# Patient Record
Sex: MALE | Race: WHITE | NOT HISPANIC OR LATINO | URBAN - METROPOLITAN AREA
[De-identification: names, ages, dates, MRNs, and addresses within clinical notes are randomized per-mention and may not be internally consistent; named-entity substitution may affect disease eponyms.]

---

## 2020-09-24 ENCOUNTER — APPOINTMENT (OUTPATIENT)
Dept: URBAN - METROPOLITAN AREA CLINIC 217 | Age: 70
Setting detail: DERMATOLOGY
End: 2020-09-24

## 2020-09-24 VITALS — TEMPERATURE: 97.7 F

## 2020-09-24 DIAGNOSIS — L81.4 OTHER MELANIN HYPERPIGMENTATION: ICD-10-CM

## 2020-09-24 DIAGNOSIS — D485 NEOPLASM OF UNCERTAIN BEHAVIOR OF SKIN: ICD-10-CM

## 2020-09-24 PROBLEM — D48.5 NEOPLASM OF UNCERTAIN BEHAVIOR OF SKIN: Status: ACTIVE | Noted: 2020-09-24

## 2020-09-24 PROCEDURE — OTHER REASSURANCE: OTHER

## 2020-09-24 PROCEDURE — OTHER SHAVE REMOVAL: OTHER

## 2020-09-24 PROCEDURE — 99203 OFFICE O/P NEW LOW 30 MIN: CPT | Mod: 25

## 2020-09-24 PROCEDURE — OTHER DIAGNOSIS COMMENT: OTHER

## 2020-09-24 PROCEDURE — 11301 SHAVE SKIN LESION 0.6-1.0 CM: CPT

## 2020-09-24 PROCEDURE — OTHER COUNSELING: OTHER

## 2020-09-24 ASSESSMENT — LOCATION DETAILED DESCRIPTION DERM
LOCATION DETAILED: RIGHT RIB CAGE
LOCATION DETAILED: LEFT SUPERIOR UPPER BACK

## 2020-09-24 ASSESSMENT — LOCATION SIMPLE DESCRIPTION DERM
LOCATION SIMPLE: ABDOMEN
LOCATION SIMPLE: LEFT UPPER BACK

## 2020-09-24 ASSESSMENT — LOCATION ZONE DERM: LOCATION ZONE: TRUNK

## 2020-09-24 NOTE — PROCEDURE: SHAVE REMOVAL
Post-Care Instructions: I reviewed with the patient in detail post-care instructions. Patient is to keep the office bandage on and dry overnight, and then apply Polysporin, Bacitracin or Vasoline daily until healed.  Patient may irrigate with water to remove any crusting.  Wound Care written instructions given.
Wound Care: Vaseline
Billing Type: Third-Party Bill
Add Variable For Additional Medical Justification: No
Path Notes (To The Dermatopathologist): CC: Dr. Ewa Cooper
X Size Of Lesion In Cm (Optional): 0
Anesthesia Volume In Cc: 1
Anticipated Plan (Based On Presumed Biopsy Results): Further management pending pathology
Render Post-Care Instructions In Note?: yes
Hemostasis: Aluminum Chloride and Electrocautery
Biopsy Method: Dermablade
Consent was obtained from the patient. The risks and benefits to therapy were discussed in detail. Specifically, the risks of infection, scarring, bleeding, prolonged wound healing, incomplete removal, allergy to anesthesia, nerve injury and recurrence were addressed. Prior to the procedure, the treatment site was clearly identified and confirmed by the patient.
Anesthesia Type: 1% Xylocaine without epinephrine
Detail Level: Detailed
Size Of Lesion In Cm (Required): 0.6
Medical Necessity Information: It is in your best interest to select a reason for this procedure from the list below. All of these items fulfill various CMS LCD requirements except the new and changing color options.

## 2021-06-25 RX ORDER — CELECOXIB 200 MG/1
200 CAPSULE ORAL PRN
COMMUNITY

## 2021-06-25 RX ORDER — NEBIVOLOL 5 MG/1
5 TABLET ORAL DAILY
COMMUNITY

## 2021-06-25 RX ORDER — CYCLOBENZAPRINE HCL 5 MG
5 TABLET ORAL 3 TIMES DAILY PRN
COMMUNITY

## 2021-06-25 NOTE — PROGRESS NOTES
procedure to notify you if your arrival time changes    [x] If you receive a survey after surgery we would greatly appreciate your comments    [] Parent/guardian of a minor must accompany their child and remain on the premises  the entire time they are under our care     [] Pediatric patients may bring favorite toy, blanket or comfort item with them    [] A caregiver or family member must remain with the patient during their stay if they are mentally handicapped, have dementia, disoriented or unable to use a call light or would be a safety concern if left unattended    [x] Please notify surgeon if you develop any illness between now and time of surgery (cold, cough, sore throat, fever, nausea, vomiting) or any signs of infections  including skin, wounds, and dental.    [x]  The Outpatient Pharm[facility] is available to fill your prescription here on your day of surgery, ask your preop nurse for details    [x] Other instructions: Wear comfortable clothing    EDUCATIONAL MATERIALS PROVIDED:    [] PAT Preoperative Education Packet/Booklet     [] Medication List    [] Transfusion bracelet applied with instructions    [] Shower with soap, lather and rinse well, and use CHG wipes provided the evening before surgery as instructed    [] Incentive spirometer with instructions          Have you been tested for COVID  No           Have you been told you were positive for COVID No  Have you had any known exposure to someone that is positive for COVID No  Do you have a cough                   No              Do you have shortness of breath No                 Do you have a sore throat            No                Are you having chills                    No                Are you having muscle aches. No                    Please come to the hospital wearing a mask and have your significant other wear a mask as well.   Both of you should check your temperature before leaving to come here,  if it is 100 or higher please call [phone] for instruction.

## 2021-06-28 ENCOUNTER — ANESTHESIA (OUTPATIENT)
Dept: OPERATING ROOM | Age: 71
End: 2021-06-28
Payer: MEDICARE

## 2021-06-28 ENCOUNTER — ANESTHESIA EVENT (OUTPATIENT)
Dept: OPERATING ROOM | Age: 71
End: 2021-06-28
Payer: MEDICARE

## 2021-06-28 ENCOUNTER — HOSPITAL ENCOUNTER (OUTPATIENT)
Age: 71
Setting detail: OUTPATIENT SURGERY
Discharge: HOME | End: 2021-06-28
Attending: OPHTHALMOLOGY | Admitting: OPHTHALMOLOGY
Payer: MEDICARE

## 2021-06-28 VITALS — DIASTOLIC BLOOD PRESSURE: 102 MMHG | OXYGEN SATURATION: 99 % | SYSTOLIC BLOOD PRESSURE: 168 MMHG

## 2021-06-28 VITALS
TEMPERATURE: 97.4 F | BODY MASS INDEX: 31.44 KG/M2 | OXYGEN SATURATION: 97 % | WEIGHT: 245 LBS | RESPIRATION RATE: 14 BRPM | DIASTOLIC BLOOD PRESSURE: 76 MMHG | SYSTOLIC BLOOD PRESSURE: 139 MMHG | HEIGHT: 74 IN | HEART RATE: 56 BPM

## 2021-06-28 PROBLEM — H33.002 RETINAL DETACHMENT OF LEFT EYE DUE TO TEAR OF RETINA: Chronic | Status: ACTIVE | Noted: 2021-06-28

## 2021-06-28 PROCEDURE — 6370000000 HC RX 637 (ALT 250 FOR IP): Performed by: OPHTHALMOLOGY

## 2021-06-28 PROCEDURE — 3700000001 HC ADD 15 MINUTES (ANESTHESIA): Performed by: OPHTHALMOLOGY

## 2021-06-28 PROCEDURE — 3600000003 HC SURGERY LEVEL 3 BASE: Performed by: OPHTHALMOLOGY

## 2021-06-28 PROCEDURE — 6360000002 HC RX W HCPCS: Performed by: NURSE ANESTHETIST, CERTIFIED REGISTERED

## 2021-06-28 PROCEDURE — 6360000002 HC RX W HCPCS: Performed by: OPHTHALMOLOGY

## 2021-06-28 PROCEDURE — 2580000003 HC RX 258: Performed by: OPHTHALMOLOGY

## 2021-06-28 PROCEDURE — 2500000003 HC RX 250 WO HCPCS: Performed by: NURSE ANESTHETIST, CERTIFIED REGISTERED

## 2021-06-28 PROCEDURE — 3700000000 HC ANESTHESIA ATTENDED CARE: Performed by: OPHTHALMOLOGY

## 2021-06-28 PROCEDURE — 2709999900 HC NON-CHARGEABLE SUPPLY: Performed by: OPHTHALMOLOGY

## 2021-06-28 PROCEDURE — 6370000000 HC RX 637 (ALT 250 FOR IP)

## 2021-06-28 PROCEDURE — 7100000011 HC PHASE II RECOVERY - ADDTL 15 MIN: Performed by: OPHTHALMOLOGY

## 2021-06-28 PROCEDURE — 2500000003 HC RX 250 WO HCPCS: Performed by: OPHTHALMOLOGY

## 2021-06-28 PROCEDURE — 7100000010 HC PHASE II RECOVERY - FIRST 15 MIN: Performed by: OPHTHALMOLOGY

## 2021-06-28 PROCEDURE — 3600000013 HC SURGERY LEVEL 3 ADDTL 15MIN: Performed by: OPHTHALMOLOGY

## 2021-06-28 RX ORDER — TRIAMCINOLONE ACETONIDE 40 MG/ML
INJECTION, SUSPENSION INTRA-ARTICULAR; INTRAMUSCULAR PRN
Status: DISCONTINUED | OUTPATIENT
Start: 2021-06-28 | End: 2021-06-28 | Stop reason: ALTCHOICE

## 2021-06-28 RX ORDER — ACETAMINOPHEN 325 MG/1
650 TABLET ORAL
Status: COMPLETED | OUTPATIENT
Start: 2021-06-28 | End: 2021-06-28

## 2021-06-28 RX ORDER — CEFAZOLIN SODIUM 1 G/3ML
INJECTION, POWDER, FOR SOLUTION INTRAMUSCULAR; INTRAVENOUS PRN
Status: DISCONTINUED | OUTPATIENT
Start: 2021-06-28 | End: 2021-06-28 | Stop reason: ALTCHOICE

## 2021-06-28 RX ORDER — SODIUM CHLORIDE 9 MG/ML
25 INJECTION, SOLUTION INTRAVENOUS PRN
Status: DISCONTINUED | OUTPATIENT
Start: 2021-06-28 | End: 2021-06-28 | Stop reason: HOSPADM

## 2021-06-28 RX ORDER — DEXAMETHASONE SODIUM PHOSPHATE 10 MG/ML
INJECTION, SOLUTION INTRAMUSCULAR; INTRAVENOUS PRN
Status: DISCONTINUED | OUTPATIENT
Start: 2021-06-28 | End: 2021-06-28 | Stop reason: ALTCHOICE

## 2021-06-28 RX ORDER — LIDOCAINE HYDROCHLORIDE 20 MG/ML
INJECTION, SOLUTION EPIDURAL; INFILTRATION; INTRACAUDAL; PERINEURAL PRN
Status: DISCONTINUED | OUTPATIENT
Start: 2021-06-28 | End: 2021-06-28

## 2021-06-28 RX ORDER — SODIUM CHLORIDE 0.9 % (FLUSH) 0.9 %
5-40 SYRINGE (ML) INJECTION EVERY 12 HOURS SCHEDULED
Status: DISCONTINUED | OUTPATIENT
Start: 2021-06-28 | End: 2021-06-28 | Stop reason: HOSPADM

## 2021-06-28 RX ORDER — HYDROCODONE BITARTRATE AND ACETAMINOPHEN 5; 325 MG/1; MG/1
1 TABLET ORAL PRN
Status: DISCONTINUED | OUTPATIENT
Start: 2021-06-28 | End: 2021-06-28 | Stop reason: HOSPADM

## 2021-06-28 RX ORDER — PROPOFOL 10 MG/ML
INJECTION, EMULSION INTRAVENOUS PRN
Status: DISCONTINUED | OUTPATIENT
Start: 2021-06-28 | End: 2021-06-28 | Stop reason: SDUPTHER

## 2021-06-28 RX ORDER — FLURBIPROFEN SODIUM 0.3 MG/ML
1 SOLUTION/ DROPS OPHTHALMIC PRN
Status: COMPLETED | OUTPATIENT
Start: 2021-06-28 | End: 2021-06-28

## 2021-06-28 RX ORDER — ATROPINE SULFATE 10 MG/ML
SOLUTION/ DROPS OPHTHALMIC PRN
Status: DISCONTINUED | OUTPATIENT
Start: 2021-06-28 | End: 2021-06-28 | Stop reason: ALTCHOICE

## 2021-06-28 RX ORDER — LIDOCAINE HYDROCHLORIDE 20 MG/ML
INJECTION, SOLUTION EPIDURAL; INFILTRATION; INTRACAUDAL; PERINEURAL PRN
Status: DISCONTINUED | OUTPATIENT
Start: 2021-06-28 | End: 2021-06-28 | Stop reason: SDUPTHER

## 2021-06-28 RX ORDER — BALANCED SALT SOLUTION ENRICHED WITH BICARBONATE, DEXTROSE, AND GLUTATHIONE
KIT INTRAOCULAR PRN
Status: DISCONTINUED | OUTPATIENT
Start: 2021-06-28 | End: 2021-06-28 | Stop reason: ALTCHOICE

## 2021-06-28 RX ORDER — ACETAMINOPHEN 325 MG/1
TABLET ORAL
Status: COMPLETED
Start: 2021-06-28 | End: 2021-06-28

## 2021-06-28 RX ORDER — TOBRAMYCIN AND DEXAMETHASONE 3; 1 MG/ML; MG/ML
1 SUSPENSION/ DROPS OPHTHALMIC PRN
Status: COMPLETED | OUTPATIENT
Start: 2021-06-28 | End: 2021-06-28

## 2021-06-28 RX ORDER — PHENYLEPHRINE HYDROCHLORIDE 100 MG/ML
1 SOLUTION/ DROPS OPHTHALMIC PRN
Status: COMPLETED | OUTPATIENT
Start: 2021-06-28 | End: 2021-06-28

## 2021-06-28 RX ORDER — ATROPINE SULFATE 10 MG/ML
1 SOLUTION/ DROPS OPHTHALMIC PRN
Status: DISCONTINUED | OUTPATIENT
Start: 2021-06-28 | End: 2021-06-28 | Stop reason: HOSPADM

## 2021-06-28 RX ORDER — SODIUM CHLORIDE, SODIUM LACTATE, POTASSIUM CHLORIDE, CALCIUM CHLORIDE 600; 310; 30; 20 MG/100ML; MG/100ML; MG/100ML; MG/100ML
INJECTION, SOLUTION INTRAVENOUS CONTINUOUS
Status: DISCONTINUED | OUTPATIENT
Start: 2021-06-28 | End: 2021-06-28 | Stop reason: HOSPADM

## 2021-06-28 RX ORDER — SODIUM CHLORIDE 0.9 % (FLUSH) 0.9 %
5-40 SYRINGE (ML) INJECTION PRN
Status: DISCONTINUED | OUTPATIENT
Start: 2021-06-28 | End: 2021-06-28 | Stop reason: HOSPADM

## 2021-06-28 RX ORDER — HYDROCODONE BITARTRATE AND ACETAMINOPHEN 5; 325 MG/1; MG/1
2 TABLET ORAL PRN
Status: DISCONTINUED | OUTPATIENT
Start: 2021-06-28 | End: 2021-06-28 | Stop reason: HOSPADM

## 2021-06-28 RX ADMIN — SODIUM CHLORIDE, POTASSIUM CHLORIDE, SODIUM LACTATE AND CALCIUM CHLORIDE: 600; 310; 30; 20 INJECTION, SOLUTION INTRAVENOUS at 10:50

## 2021-06-28 RX ADMIN — ACETAMINOPHEN 650 MG: 325 TABLET ORAL at 14:50

## 2021-06-28 RX ADMIN — Medication 1 DROP: at 10:32

## 2021-06-28 RX ADMIN — PROPOFOL 120 MG: 10 INJECTION, EMULSION INTRAVENOUS at 13:15

## 2021-06-28 RX ADMIN — LIDOCAINE HYDROCHLORIDE 40 MG: 20 INJECTION, SOLUTION EPIDURAL; INFILTRATION; INTRACAUDAL; PERINEURAL at 13:15

## 2021-06-28 RX ADMIN — Medication 1 DROP: at 10:42

## 2021-06-28 RX ADMIN — PHENYLEPHRINE HYDROCHLORIDE 1 DROP: 100 SOLUTION/ DROPS OPHTHALMIC at 10:37

## 2021-06-28 RX ADMIN — TOBRAMYCIN AND DEXAMETHASONE 1 DROP: 3; 1 SUSPENSION/ DROPS OPHTHALMIC at 10:32

## 2021-06-28 RX ADMIN — TOBRAMYCIN AND DEXAMETHASONE 1 DROP: 3; 1 SUSPENSION/ DROPS OPHTHALMIC at 10:37

## 2021-06-28 RX ADMIN — ATROPINE SULFATE 1 DROP: 10 SOLUTION/ DROPS OPHTHALMIC at 10:26

## 2021-06-28 RX ADMIN — PHENYLEPHRINE HYDROCHLORIDE 1 DROP: 100 SOLUTION/ DROPS OPHTHALMIC at 10:43

## 2021-06-28 RX ADMIN — Medication 1 DROP: at 10:37

## 2021-06-28 RX ADMIN — PHENYLEPHRINE HYDROCHLORIDE 1 DROP: 100 SOLUTION/ DROPS OPHTHALMIC at 10:32

## 2021-06-28 RX ADMIN — TOBRAMYCIN AND DEXAMETHASONE 1 DROP: 3; 1 SUSPENSION/ DROPS OPHTHALMIC at 10:43

## 2021-06-28 ASSESSMENT — PAIN DESCRIPTION - ORIENTATION
ORIENTATION: LEFT
ORIENTATION: LEFT

## 2021-06-28 ASSESSMENT — PAIN DESCRIPTION - LOCATION
LOCATION: EYE
LOCATION: EYE

## 2021-06-28 ASSESSMENT — PULMONARY FUNCTION TESTS
PIF_VALUE: 1
PIF_VALUE: 0
PIF_VALUE: 1
PIF_VALUE: 0
PIF_VALUE: 1
PIF_VALUE: 0
PIF_VALUE: 1

## 2021-06-28 ASSESSMENT — PAIN DESCRIPTION - DESCRIPTORS: DESCRIPTORS: DISCOMFORT

## 2021-06-28 ASSESSMENT — PAIN - FUNCTIONAL ASSESSMENT: PAIN_FUNCTIONAL_ASSESSMENT: 0-10

## 2021-06-28 ASSESSMENT — PAIN SCALES - GENERAL
PAINLEVEL_OUTOF10: 4
PAINLEVEL_OUTOF10: 4
PAINLEVEL_OUTOF10: 6
PAINLEVEL_OUTOF10: 6

## 2021-06-28 ASSESSMENT — PAIN DESCRIPTION - PAIN TYPE
TYPE: SURGICAL PAIN
TYPE: SURGICAL PAIN

## 2021-06-28 NOTE — ANESTHESIA PRE PROCEDURE
Department of Anesthesiology  Preprocedure Note       Name:  Bea Amador   Age:  72 y.o.  :  1950                                          MRN:  [id number]         Date:  2021      Surgeon: Viet York):  Fernando Waters MD    Procedure: Procedure(s):  PARS PLANA VITRECTOMY 25 GAUGE GAS FLUID EXCHANGE LASER POSSIBLE SCLERAL BUCKLE LEFT EYE    Medications prior to admission:   Prior to Admission medications    Medication Sig Start Date End Date Taking?  Authorizing Provider   nebivolol (BYSTOLIC) 5 MG tablet Take 5 mg by mouth daily Instructed to take morning of surgery with a sip of water   Yes Historical Provider, MD   celecoxib (CELEBREX) 200 MG capsule Take 200 mg by mouth as needed for Pain   Yes Historical Provider, MD   cyclobenzaprine (FLEXERIL) 5 MG tablet Take 5 mg by mouth 3 times daily as needed for Muscle spasms   Yes Historical Provider, MD       Current medications:    Current Facility-Administered Medications   Medication Dose Route Frequency Provider Last Rate Last Admin    lactated ringers infusion   Intravenous Continuous Fernando Waters MD        sodium chloride flush 0.9 % injection 5-40 mL  5-40 mL Intravenous 2 times per day Fernando Waters MD        sodium chloride flush 0.9 % injection 5-40 mL  5-40 mL Intravenous PRN Fernando Waters MD        0.9 % sodium chloride infusion  25 mL Intravenous PRN Fernando Waters MD        atropine 1 % ophthalmic solution 1 drop  1 drop Left Eye PRN Fernando Waters MD        tobramycin-dexamethasone [facility]) ophthalmic suspension 1 drop  1 drop Left Eye PRN Fernando Waters MD        flurbiprofen (OCUFEN) 0.03 % ophthalmic solution 1 drop  1 drop Left Eye PRN Fernando Waters MD        phenylephrine (CELINA-SYNEPHRINE) 10 % ophthalmic solution 1 drop  1 drop Left Eye PRN Fernando Waters MD        HYDROcodone-acetaminophen (NORCO) 5-325 MG per tablet 1 tablet  1 tablet Oral PRN Shay Valencia MD        Or  HYDROcodone-acetaminophen (NORCO) 5-325 MG per tablet 2 tablet  2 tablet Oral PRN Eloise Salinas MD           Allergies: Allergies   Allergen Reactions    Bee Venom        Problem List:  There is no problem list on file for this patient. Past Medical History:        Diagnosis Date    Arthritis     H/O detached retina repair     Hypertension        Past Surgical History:        Procedure Laterality Date    COLONOSCOPY      EYE SURGERY Left 04/14/2021    detached retina       Social History:    Social History     Tobacco Use    Smoking status: Former Smoker    Smokeless tobacco: Never Used   Substance Use Topics    Alcohol use: Yes     Comment: social                                Counseling given: Not Answered      Vital Signs (Current):   Vitals:    06/25/21 1247 06/28/21 1025   BP:  (!) 159/89   Pulse:  60   Resp:  18   Temp:  97.2 °F (36.2 °C)   TempSrc:  Temporal   SpO2:  95%   Weight: 245 lb (111.1 kg) 245 lb (111.1 kg)   Height: 6' 2\" (1.88 m) 6' 2\" (1.88 m)                                              BP Readings from Last 3 Encounters:   06/28/21 (!) 159/89       NPO Status: Time of last liquid consumption: 2000                                                                               BMI:   Wt Readings from Last 3 Encounters:   06/28/21 245 lb (111.1 kg)     Body mass index is 31.46 kg/m². CBC: No results found for: WBC, RBC, HGB, HCT, MCV, RDW, PLT    CMP: No results found for: NA, K, CL, CO2, BUN, CREATININE, GFRAA, AGRATIO, LABGLOM, GLUCOSE, PROT, CALCIUM, BILITOT, ALKPHOS, AST, ALT    POC Tests: No results for input(s): POCGLU, POCNA, POCK, POCCL, POCBUN, POCHEMO, POCHCT in the last 72 hours.     Coags: No results found for: PROTIME, INR, APTT    HCG (If Applicable): No results found for: PREGTESTUR, PREGSERUM, HCG, HCGQUANT     ABGs: No results found for: PHART, PO2ART, XSL7JAZ, GGS7OLN, BEART, J5MLGRFC     Type & Screen (If Applicable):  No results found for: LABABO, [address]    Drug/Infectious Status (If Applicable):  No results found for: HIV, HEPCAB    COVID-19 Screening (If Applicable): No results found for: COVID19        Anesthesia Evaluation  Patient summary reviewed no history of anesthetic complications:   Airway: Mallampati: II  TM distance: >3 FB   Neck ROM: full   Dental:          Pulmonary: breath sounds clear to auscultation                            ROS comment: Former Smoker   Cardiovascular:    (+) hypertension:,         Rhythm: regular  Rate: normal           Beta Blocker:  Dose within 24 Hrs         Neuro/Psych:   Negative Neuro/Psych ROS              GI/Hepatic/Renal: Neg GI/Hepatic/Renal ROS            Endo/Other:    (+) : arthritis: OA., .                  ROS comment: obese Abdominal:             Vascular: negative vascular ROS. Other Findings:           Anesthesia Plan      MAC     ASA 2       Induction: intravenous. Anesthetic plan and risks discussed with patient. Plan discussed with CRNA.                 Shama Espinosa MD   6/28/2021

## 2021-06-28 NOTE — BRIEF OP NOTE
Brief Postoperative Note      Patient: Duong Ewing  YOB: 1950  MRN: [id number]    Date of Procedure: 6/28/2021    Pre-Op Diagnosis: RETINAL DETACHMENT LEFT EYE    Post-Op Diagnosis: Same       Procedure(s):  PARS PLANA VITRECTOMY 25 GAUGE GAS FLUID EXCHANGE (C3F8)  LASER LEFT EYE    Surgeon(s):  Mac Colbert MD    Assistant:  Surgical Assistant: Cm Saenz    Anesthesia: Monitor Anesthesia Care    Estimated Blood Loss (mL): Minimal    Complications: None    Specimens:   none  Implants:  none    Drains: none  Findings:retinal detachment  Electronically signed by Mac Colbert MD on 6/28/2021 at 2:21 PM

## 2023-01-16 ENCOUNTER — APPOINTMENT (RX ONLY)
Dept: URBAN - METROPOLITAN AREA CLINIC 137 | Facility: CLINIC | Age: 73
Setting detail: DERMATOLOGY
End: 2023-01-16

## 2023-01-16 DIAGNOSIS — Z71.89 OTHER SPECIFIED COUNSELING: ICD-10-CM

## 2023-01-16 DIAGNOSIS — D18.0 HEMANGIOMA: ICD-10-CM | Status: UNCHANGED

## 2023-01-16 DIAGNOSIS — L82.1 OTHER SEBORRHEIC KERATOSIS: ICD-10-CM

## 2023-01-16 DIAGNOSIS — T07XXXA INSECT BITE, NONVENOMOUS, OF OTHER, MULTIPLE, AND UNSPECIFIED SITES, WITHOUT MENTION OF INFECTION: ICD-10-CM

## 2023-01-16 DIAGNOSIS — Z85.828 PERSONAL HISTORY OF OTHER MALIGNANT NEOPLASM OF SKIN: ICD-10-CM | Status: STABLE

## 2023-01-16 DIAGNOSIS — L57.8 OTHER SKIN CHANGES DUE TO CHRONIC EXPOSURE TO NONIONIZING RADIATION: ICD-10-CM | Status: UNCHANGED

## 2023-01-16 PROBLEM — S40.262A INSECT BITE (NONVENOMOUS) OF LEFT SHOULDER, INITIAL ENCOUNTER: Status: ACTIVE | Noted: 2023-01-16

## 2023-01-16 PROBLEM — D18.01 HEMANGIOMA OF SKIN AND SUBCUTANEOUS TISSUE: Status: ACTIVE | Noted: 2023-01-16

## 2023-01-16 PROCEDURE — 99213 OFFICE O/P EST LOW 20 MIN: CPT

## 2023-01-16 PROCEDURE — ? COUNSELING

## 2023-01-16 PROCEDURE — ? SUNSCREEN RECOMMENDATIONS

## 2023-01-16 ASSESSMENT — LOCATION DETAILED DESCRIPTION DERM
LOCATION DETAILED: LEFT CLAVICULAR NECK
LOCATION DETAILED: LEFT MEDIAL SUPERIOR CHEST
LOCATION DETAILED: LEFT POSTERIOR SHOULDER
LOCATION DETAILED: PERIUMBILICAL SKIN
LOCATION DETAILED: INFERIOR THORACIC SPINE
LOCATION DETAILED: RIGHT INFERIOR LATERAL MIDBACK
LOCATION DETAILED: RIGHT INFERIOR CENTRAL MALAR CHEEK

## 2023-01-16 ASSESSMENT — LOCATION ZONE DERM
LOCATION ZONE: FACE
LOCATION ZONE: NECK
LOCATION ZONE: ARM
LOCATION ZONE: TRUNK

## 2023-01-16 ASSESSMENT — LOCATION SIMPLE DESCRIPTION DERM
LOCATION SIMPLE: ABDOMEN
LOCATION SIMPLE: CHEST
LOCATION SIMPLE: RIGHT LOWER BACK
LOCATION SIMPLE: RIGHT CHEEK
LOCATION SIMPLE: LEFT ANTERIOR NECK
LOCATION SIMPLE: LEFT SHOULDER
LOCATION SIMPLE: UPPER BACK

## 2024-01-17 ENCOUNTER — APPOINTMENT (RX ONLY)
Dept: URBAN - METROPOLITAN AREA CLINIC 137 | Facility: CLINIC | Age: 74
Setting detail: DERMATOLOGY
End: 2024-01-17

## 2024-01-17 DIAGNOSIS — L57.8 OTHER SKIN CHANGES DUE TO CHRONIC EXPOSURE TO NONIONIZING RADIATION: ICD-10-CM | Status: UNCHANGED

## 2024-01-17 DIAGNOSIS — Z85.828 PERSONAL HISTORY OF OTHER MALIGNANT NEOPLASM OF SKIN: ICD-10-CM | Status: STABLE

## 2024-01-17 DIAGNOSIS — L57.0 ACTINIC KERATOSIS: ICD-10-CM

## 2024-01-17 DIAGNOSIS — D18.0 HEMANGIOMA: ICD-10-CM | Status: UNCHANGED

## 2024-01-17 DIAGNOSIS — Z71.89 OTHER SPECIFIED COUNSELING: ICD-10-CM

## 2024-01-17 PROBLEM — D18.01 HEMANGIOMA OF SKIN AND SUBCUTANEOUS TISSUE: Status: ACTIVE | Noted: 2024-01-17

## 2024-01-17 PROCEDURE — ? COUNSELING

## 2024-01-17 PROCEDURE — ? SUNSCREEN RECOMMENDATIONS

## 2024-01-17 PROCEDURE — 99213 OFFICE O/P EST LOW 20 MIN: CPT | Mod: 25

## 2024-01-17 PROCEDURE — ? LIQUID NITROGEN

## 2024-01-17 PROCEDURE — 17000 DESTRUCT PREMALG LESION: CPT

## 2024-01-17 ASSESSMENT — LOCATION DETAILED DESCRIPTION DERM
LOCATION DETAILED: LEFT CLAVICULAR NECK
LOCATION DETAILED: PERIUMBILICAL SKIN
LOCATION DETAILED: LEFT INFERIOR FOREHEAD
LOCATION DETAILED: INFERIOR THORACIC SPINE

## 2024-01-17 ASSESSMENT — LOCATION SIMPLE DESCRIPTION DERM
LOCATION SIMPLE: LEFT FOREHEAD
LOCATION SIMPLE: ABDOMEN
LOCATION SIMPLE: LEFT ANTERIOR NECK
LOCATION SIMPLE: UPPER BACK

## 2024-01-17 ASSESSMENT — LOCATION ZONE DERM
LOCATION ZONE: TRUNK
LOCATION ZONE: NECK
LOCATION ZONE: FACE

## 2024-05-31 ENCOUNTER — APPOINTMENT (RX ONLY)
Dept: URBAN - METROPOLITAN AREA CLINIC 137 | Facility: CLINIC | Age: 74
Setting detail: DERMATOLOGY
End: 2024-05-31

## 2024-05-31 DIAGNOSIS — D22 MELANOCYTIC NEVI: ICD-10-CM

## 2024-05-31 DIAGNOSIS — L57.0 ACTINIC KERATOSIS: ICD-10-CM

## 2024-05-31 PROBLEM — D22.39 MELANOCYTIC NEVI OF OTHER PARTS OF FACE: Status: ACTIVE | Noted: 2024-05-31

## 2024-05-31 PROCEDURE — 17003 DESTRUCT PREMALG LES 2-14: CPT

## 2024-05-31 PROCEDURE — 17000 DESTRUCT PREMALG LESION: CPT

## 2024-05-31 PROCEDURE — ? COUNSELING

## 2024-05-31 PROCEDURE — 99212 OFFICE O/P EST SF 10 MIN: CPT | Mod: 25

## 2024-05-31 PROCEDURE — ? LIQUID NITROGEN

## 2024-05-31 ASSESSMENT — LOCATION DETAILED DESCRIPTION DERM
LOCATION DETAILED: RIGHT INFERIOR LATERAL MALAR CHEEK
LOCATION DETAILED: LEFT CENTRAL BUCCAL CHEEK
LOCATION DETAILED: LEFT INFERIOR CENTRAL MALAR CHEEK

## 2024-05-31 ASSESSMENT — LOCATION SIMPLE DESCRIPTION DERM
LOCATION SIMPLE: RIGHT CHEEK
LOCATION SIMPLE: LEFT CHEEK

## 2024-05-31 ASSESSMENT — LOCATION ZONE DERM: LOCATION ZONE: FACE

## 2024-05-31 NOTE — PROCEDURE: LIQUID NITROGEN
Show Applicator Variable?: Yes
Consent: The patient's consent was obtained including but not limited to risks of crusting, scabbing, blistering, scarring, darker or lighter pigmentary change, recurrence, incomplete removal and infection.
Detail Level: Detailed
Duration Of Freeze Thaw-Cycle (Seconds): 1
Render Post-Care Instructions In Note?: no
Number Of Freeze-Thaw Cycles: 2 freeze-thaw cycles
Post-Care Instructions: I reviewed with the patient in detail post-care instructions. Patient is to wear sunprotection, and avoid picking at any of the treated lesions. Pt may apply Vaseline to crusted or scabbing areas.
Application Tool (Optional): Liquid Nitrogen Sprayer

## 2025-01-27 ENCOUNTER — APPOINTMENT (OUTPATIENT)
Dept: URBAN - METROPOLITAN AREA CLINIC 137 | Facility: CLINIC | Age: 75
Setting detail: DERMATOLOGY
End: 2025-01-27

## 2025-01-27 DIAGNOSIS — D18.0 HEMANGIOMA: ICD-10-CM | Status: UNCHANGED

## 2025-01-27 DIAGNOSIS — L82.1 OTHER SEBORRHEIC KERATOSIS: ICD-10-CM | Status: UNCHANGED

## 2025-01-27 DIAGNOSIS — Z87.2 PERSONAL HISTORY OF DISEASES OF THE SKIN AND SUBCUTANEOUS TISSUE: ICD-10-CM

## 2025-01-27 DIAGNOSIS — Z71.89 OTHER SPECIFIED COUNSELING: ICD-10-CM

## 2025-01-27 DIAGNOSIS — L57.8 OTHER SKIN CHANGES DUE TO CHRONIC EXPOSURE TO NONIONIZING RADIATION: ICD-10-CM | Status: UNCHANGED

## 2025-01-27 DIAGNOSIS — L57.0 ACTINIC KERATOSIS: ICD-10-CM

## 2025-01-27 PROBLEM — D18.01 HEMANGIOMA OF SKIN AND SUBCUTANEOUS TISSUE: Status: ACTIVE | Noted: 2025-01-27

## 2025-01-27 PROCEDURE — ? SUNSCREEN RECOMMENDATIONS

## 2025-01-27 PROCEDURE — ? COUNSELING

## 2025-01-27 PROCEDURE — 99213 OFFICE O/P EST LOW 20 MIN: CPT | Mod: 25

## 2025-01-27 PROCEDURE — 17000 DESTRUCT PREMALG LESION: CPT

## 2025-01-27 PROCEDURE — ? LIQUID NITROGEN

## 2025-01-27 ASSESSMENT — LOCATION DETAILED DESCRIPTION DERM: LOCATION DETAILED: RIGHT POSTERIOR SHOULDER

## 2025-01-27 ASSESSMENT — LOCATION ZONE DERM: LOCATION ZONE: ARM

## 2025-01-27 ASSESSMENT — LOCATION SIMPLE DESCRIPTION DERM: LOCATION SIMPLE: RIGHT SHOULDER

## 2025-04-15 ENCOUNTER — APPOINTMENT (OUTPATIENT)
Dept: URBAN - METROPOLITAN AREA CLINIC 137 | Facility: CLINIC | Age: 75
Setting detail: DERMATOLOGY
End: 2025-04-15

## 2025-04-15 DIAGNOSIS — L82.0 INFLAMED SEBORRHEIC KERATOSIS: ICD-10-CM | Status: INADEQUATELY CONTROLLED

## 2025-04-15 PROCEDURE — ? LIQUID NITROGEN

## 2025-04-15 PROCEDURE — ? POINTED OUT BY PATIENT

## 2025-04-15 PROCEDURE — 17110 DESTRUCTION B9 LES UP TO 14: CPT

## 2025-04-15 PROCEDURE — ? COUNSELING

## 2025-04-15 ASSESSMENT — LOCATION DETAILED DESCRIPTION DERM: LOCATION DETAILED: LEFT AXILLARY VAULT

## 2025-04-15 ASSESSMENT — LOCATION ZONE DERM: LOCATION ZONE: AXILLAE

## 2025-04-15 ASSESSMENT — LOCATION SIMPLE DESCRIPTION DERM: LOCATION SIMPLE: LEFT AXILLARY VAULT

## (undated) DEVICE — TAPE ADH W1INXL10YD WHT PAPR GENTLE BRTH FLX COMFORTABLE

## (undated) DEVICE — BIOM OPTIC SET DISPOSABLE, WITH BIOM HD FLEX LENS FOR F=175 MM OR F=200 MM: Brand: BIOM OPTIC SET DISPOSABLE, WITH BIOM HD FLEX LENS FOR F=175 MM OR F=200 MM

## (undated) DEVICE — PACK PROCEDURE SURG RETINAL ST ES CUST

## (undated) DEVICE — SYRINGE TB 1ML NDL 25GA L0.625IN PLAS SLIP TIP CONVENTIONAL

## (undated) DEVICE — 40436 HEAD REST OCULAR: Brand: 40436 HEAD REST OCULAR

## (undated) DEVICE — LENS VITRCTMY OCU FLAT DISP

## (undated) DEVICE — SOLUTION IV IRRIG POUR BRL 0.9% SODIUM CHL 2F7124

## (undated) DEVICE — DECANTER: Brand: UNBRANDED

## (undated) DEVICE — SET VITRECTOMY

## (undated) DEVICE — NEEDLE FLTR 18GA L1.5IN MEM THK5UM BLNT DISP

## (undated) DEVICE — SYRINGE, LUER LOCK, 5ML: Brand: MEDLINE

## (undated) DEVICE — DRAPE MICSCP FULL FLD STRL OCULUS ZEISS

## (undated) DEVICE — SYRINGE, LUER LOCK, 10ML: Brand: MEDLINE

## (undated) DEVICE — PAD PREP L 2 PLY 70% ISO ALC NONWOVEN SFT ABSRB TOP ANTISEP

## (undated) DEVICE — MICROSURGICAL INSTRUMENT 25GA SOFT TIP CANNULA, DSP, 0.8MM: Brand: ALCON

## (undated) DEVICE — CANNULA INJ 25GA SFT TIP DISP

## (undated) DEVICE — GLOVE SURG SZ 65 CRM LTX FREE POLYISOPRENE POLYMER BEAD ANTI

## (undated) DEVICE — SHIELD EYE W3XL2.5IN UNIV CLR PLAS LTWT

## (undated) DEVICE — NEEDLE HYPO 30GA L0.5IN BGE POLYPR HUB S STL REG BVL STR

## (undated) DEVICE — SYRINGE 20ML LL S/C 50

## (undated) DEVICE — 25 MM 1.2 MICRON SYRINGE FILTER: Brand: SUPOR

## (undated) DEVICE — 3M™ TEGADERM™ TRANSPARENT FILM DRESSING FRAME STYLE, 1626W, 4 IN X 4-3/4 IN (10 CM X 12 CM), 50/CT 4CT/CASE: Brand: 3M™ TEGADERM™

## (undated) DEVICE — SURGICAL PROCEDURE PACK 25 GA VLV CPM 10K

## (undated) DEVICE — SYRINGE MED 3ML CLR PLAS STD N CTRL LUERLOCK TIP DISP

## (undated) DEVICE — CAUTERY BPLR 25GA INTOCU W/ HNDPC CRD DISP FOR CX9404

## (undated) DEVICE — KIT,ANTI FOG,W/SPONGE & FLUID,SOFT PACK: Brand: MEDLINE

## (undated) DEVICE — COVER,LIGHT HANDLE,FLX,2/PK: Brand: MEDLINE INDUSTRIES, INC.

## (undated) DEVICE — BRACELET ID ALERT FOR PT INFO ISPAN

## (undated) DEVICE — 25+® REVOLUTION DSP ILM FORCEPS: Brand: ALCON GRIESHABER REVOLUTION 25+

## (undated) DEVICE — SOLUTION IV IRRIG WATER 1000ML POUR BRL 2F7114

## (undated) DEVICE — NEEDLE ANES 23GA L1.5IN RETROBLB

## (undated) DEVICE — GLOVE ORANGE PI 7   MSG9070

## (undated) DEVICE — GOWN,SIRUS,FABRNF,XL,20/CS: Brand: MEDLINE

## (undated) DEVICE — Device

## (undated) DEVICE — SYRINGE MED 50ML LUERLOCK TIP

## (undated) DEVICE — GLOVE SURG SZ 7 CRM LTX FREE POLYISOPRENE POLYMER BEAD ANTI